# Patient Record
Sex: MALE | Race: AMERICAN INDIAN OR ALASKA NATIVE | URBAN - METROPOLITAN AREA
[De-identification: names, ages, dates, MRNs, and addresses within clinical notes are randomized per-mention and may not be internally consistent; named-entity substitution may affect disease eponyms.]

---

## 2018-05-04 ENCOUNTER — INPATIENT (INPATIENT)
Facility: HOSPITAL | Age: 23
LOS: 2 days | Discharge: HOME | End: 2018-05-07
Attending: HOSPITALIST | Admitting: HOSPITALIST
Payer: OTHER MISCELLANEOUS

## 2018-05-04 VITALS
DIASTOLIC BLOOD PRESSURE: 92 MMHG | RESPIRATION RATE: 16 BRPM | OXYGEN SATURATION: 100 % | TEMPERATURE: 98 F | SYSTOLIC BLOOD PRESSURE: 108 MMHG | HEART RATE: 90 BPM

## 2018-05-04 NOTE — ED ADULT TRIAGE NOTE - CHIEF COMPLAINT QUOTE
Pt C/O  right ankle pain and swelling since Tuesday. Pt states he works at [employer] had a box fall off shelf and landing on right ankle. Right ankle appears swollen/ red, + pedal pulses bilaterally. Denies PMH. Pt appears comfortable on assessment.

## 2018-05-05 LAB
ALBUMIN SERPL ELPH-MCNC: 4.5 G/DL — SIGNIFICANT CHANGE UP (ref 3.3–5)
ALP SERPL-CCNC: 99 U/L — SIGNIFICANT CHANGE UP (ref 40–120)
ALT FLD-CCNC: 14 U/L — SIGNIFICANT CHANGE UP (ref 4–41)
AST SERPL-CCNC: 17 U/L — SIGNIFICANT CHANGE UP (ref 4–40)
BASE EXCESS BLDV CALC-SCNC: 5.5 MMOL/L — SIGNIFICANT CHANGE UP
BILIRUB SERPL-MCNC: 0.5 MG/DL — SIGNIFICANT CHANGE UP (ref 0.2–1.2)
BLOOD GAS VENOUS - CREATININE: 1.16 MG/DL — SIGNIFICANT CHANGE UP (ref 0.5–1.3)
BUN SERPL-MCNC: 13 MG/DL — SIGNIFICANT CHANGE UP (ref 7–23)
CALCIUM SERPL-MCNC: 9.2 MG/DL — SIGNIFICANT CHANGE UP (ref 8.4–10.5)
CHLORIDE BLDV-SCNC: 102 MMOL/L — SIGNIFICANT CHANGE UP (ref 96–108)
CHLORIDE SERPL-SCNC: 98 MMOL/L — SIGNIFICANT CHANGE UP (ref 98–107)
CO2 SERPL-SCNC: 29 MMOL/L — SIGNIFICANT CHANGE UP (ref 22–31)
CREAT SERPL-MCNC: 1.2 MG/DL — SIGNIFICANT CHANGE UP (ref 0.5–1.3)
CRP SERPL-MCNC: 12.2 MG/L — HIGH
ERYTHROCYTE [SEDIMENTATION RATE] IN BLOOD: 4 MM/HR — SIGNIFICANT CHANGE UP (ref 1–15)
GAS PNL BLDV: 137 MMOL/L — SIGNIFICANT CHANGE UP (ref 136–146)
GLUCOSE BLDV-MCNC: 106 — HIGH (ref 70–99)
GLUCOSE SERPL-MCNC: 97 MG/DL — SIGNIFICANT CHANGE UP (ref 70–99)
HBA1C BLD-MCNC: 5.1 % — SIGNIFICANT CHANGE UP (ref 4–5.6)
HCO3 BLDV-SCNC: 28 MMOL/L — HIGH (ref 20–27)
HCT VFR BLD CALC: 38.7 % — LOW (ref 39–50)
HCT VFR BLDV CALC: 36.5 % — LOW (ref 39–51)
HGB BLD-MCNC: 11.9 G/DL — LOW (ref 13–17)
HGB BLDV-MCNC: 11.9 G/DL — LOW (ref 13–17)
LACTATE BLDV-MCNC: 1.1 MMOL/L — SIGNIFICANT CHANGE UP (ref 0.5–2)
MCHC RBC-ENTMCNC: 19.6 PG — LOW (ref 27–34)
MCHC RBC-ENTMCNC: 30.7 % — LOW (ref 32–36)
MCV RBC AUTO: 63.9 FL — LOW (ref 80–100)
NRBC # FLD: 0 — SIGNIFICANT CHANGE UP
PCO2 BLDV: 57 MMHG — HIGH (ref 41–51)
PH BLDV: 7.35 PH — SIGNIFICANT CHANGE UP (ref 7.32–7.43)
PLATELET # BLD AUTO: 236 K/UL — SIGNIFICANT CHANGE UP (ref 150–400)
PMV BLD: SIGNIFICANT CHANGE UP FL (ref 7–13)
PO2 BLDV: 34 MMHG — LOW (ref 35–40)
POTASSIUM BLDV-SCNC: 3.5 MMOL/L — SIGNIFICANT CHANGE UP (ref 3.4–4.5)
POTASSIUM SERPL-MCNC: 3.6 MMOL/L — SIGNIFICANT CHANGE UP (ref 3.5–5.3)
POTASSIUM SERPL-SCNC: 3.6 MMOL/L — SIGNIFICANT CHANGE UP (ref 3.5–5.3)
PROT SERPL-MCNC: 7 G/DL — SIGNIFICANT CHANGE UP (ref 6–8.3)
RBC # BLD: 6.06 M/UL — HIGH (ref 4.2–5.8)
RBC # FLD: 16.2 % — HIGH (ref 10.3–14.5)
SAO2 % BLDV: 53.1 % — LOW (ref 60–85)
SODIUM SERPL-SCNC: 138 MMOL/L — SIGNIFICANT CHANGE UP (ref 135–145)
WBC # BLD: 12.06 K/UL — HIGH (ref 3.8–10.5)
WBC # FLD AUTO: 12.06 K/UL — HIGH (ref 3.8–10.5)

## 2018-05-05 PROCEDURE — 73610 X-RAY EXAM OF ANKLE: CPT | Mod: 26,RT

## 2018-05-05 RX ORDER — PIPERACILLIN AND TAZOBACTAM 4; .5 G/20ML; G/20ML
3.38 INJECTION, POWDER, LYOPHILIZED, FOR SOLUTION INTRAVENOUS ONCE
Qty: 0 | Refills: 0 | Status: DISCONTINUED | OUTPATIENT
Start: 2018-05-05 | End: 2018-05-05

## 2018-05-05 RX ORDER — KETOROLAC TROMETHAMINE 30 MG/ML
30 SYRINGE (ML) INJECTION ONCE
Qty: 0 | Refills: 0 | Status: DISCONTINUED | OUTPATIENT
Start: 2018-05-05 | End: 2018-05-05

## 2018-05-05 RX ORDER — SODIUM CHLORIDE 9 MG/ML
1000 INJECTION INTRAMUSCULAR; INTRAVENOUS; SUBCUTANEOUS ONCE
Qty: 0 | Refills: 0 | Status: COMPLETED | OUTPATIENT
Start: 2018-05-05 | End: 2018-05-05

## 2018-05-05 RX ORDER — SODIUM CHLORIDE 9 MG/ML
1000 INJECTION INTRAMUSCULAR; INTRAVENOUS; SUBCUTANEOUS
Qty: 0 | Refills: 0 | Status: DISCONTINUED | OUTPATIENT
Start: 2018-05-05 | End: 2018-05-07

## 2018-05-05 RX ADMIN — Medication 30 MILLIGRAM(S): at 03:25

## 2018-05-05 RX ADMIN — SODIUM CHLORIDE 100 MILLILITER(S): 9 INJECTION INTRAMUSCULAR; INTRAVENOUS; SUBCUTANEOUS at 06:13

## 2018-05-05 RX ADMIN — Medication 100 MILLIGRAM(S): at 11:09

## 2018-05-05 RX ADMIN — Medication 100 MILLIGRAM(S): at 19:30

## 2018-05-05 RX ADMIN — SODIUM CHLORIDE 100 MILLILITER(S): 9 INJECTION INTRAMUSCULAR; INTRAVENOUS; SUBCUTANEOUS at 15:18

## 2018-05-05 RX ADMIN — Medication 30 MILLIGRAM(S): at 03:15

## 2018-05-05 RX ADMIN — Medication 100 MILLIGRAM(S): at 02:56

## 2018-05-05 RX ADMIN — SODIUM CHLORIDE 1000 MILLILITER(S): 9 INJECTION INTRAMUSCULAR; INTRAVENOUS; SUBCUTANEOUS at 05:17

## 2018-05-05 NOTE — ED PROVIDER NOTE - MEDICAL DECISION MAKING DETAILS
likely ankle sprain; xray; aircast likely ankle sprain; xray; aircast  on reexam has scab luis from metal crate and findings consistent with cellulitis; not septic and able to range ankle minimally; no joint effusion on xray; will send ESR/CRP and place in CDU for cellulitis

## 2018-05-05 NOTE — ED CDU PROVIDER INITIAL DAY NOTE - ATTENDING CONTRIBUTION TO CARE
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  Was ambulatory at first but then swelling started yesterday along lateral malleolus and today got worse to point he was having trouble walking prompting him to come in.  Denies any other complaints.  Sustained small scab luis from metal crate.  Has noticed worsening swelling and warmth.    PE: well appearing; VSS: CTAB/L; s1 s2 no m/r/g abd soft/NT/ND ext: right ankle swollen; erythematous; small scab luis from metal crate; ankle able to be ranged; extremely warm

## 2018-05-05 NOTE — ED CDU PROVIDER INITIAL DAY NOTE - SKIN WOUND TYPE
small left lat malleoli , scabbed  with surrounding erythema and ST swelling lat mal with ttp/LACERATION(S)

## 2018-05-05 NOTE — ED ADULT NURSE REASSESSMENT NOTE - NS ED NURSE REASSESS COMMENT FT1
ARNALDO Mayes made aware Pt. Blood Pressure as noted on the flowsheet. waiting for further orders and will monitored pt. ARNALDO Mayes made aware Pt. Blood Pressure as noted on the flowsheet. waiting for further orders and will monitored pt. Pt AOX3, denies HA dizziness SOB CP palpitation weakness N V sweating

## 2018-05-05 NOTE — ED ADULT NURSE NOTE - OBJECTIVE STATEMENT
23 year old male, A&Ox3, c/o right ankle/foot pain, swelling, redness x 4 days. Pt. reports he "dropped a metal box" onto his right foot 4 days ago, experienced some initial pain, and had an abrasion on the outside of his ankle, reports pain initially subsided, but returned with swelling and redness 2 days ago. States he is now unable to walk due to pain. Right ankle/foot swelling, redness on lateral aspect of ankle with scabbed abrasion, warm to touch. Pt. able to move toes, sensation intact, but limited ROM of right ankle. No discharge from site of abrasion noted. Respirations even, unlabored. IV placed, labs sent, medicated as ordered.

## 2018-05-05 NOTE — ED PROVIDER NOTE - OBJECTIVE STATEMENT
23 yr old male with no sig PMH presents s/p box falling and hitting him on right ankle 3 days ago.  Was ambulatory at first but then swelling started yesterday along lateral malleolus and today got worse to point he was having trouble walking prompting him to come in.  Denies any other complaints.

## 2018-05-05 NOTE — ED CDU PROVIDER INITIAL DAY NOTE - MEDICAL DECISION MAKING DETAILS
Left ankle cellulitis- ESR still pending, pt afebrile with limited rom. xray ng.  will monitor vitals, provide pain control prn, IV AB, and re-eval

## 2018-05-05 NOTE — ED CDU PROVIDER INITIAL DAY NOTE - PROGRESS NOTE DETAILS
Pt resting comfortably upon arrival to CDU.  Vitals stable/improved. CRP 12 and Esr wnl, Will continue to monitor sx/vitals, and give IV AB/fluids Patient resting comfortably in CDU.  No acute complaints except pain with weight-bearing R ankle.  Exam:  well appearing, vital signs within normal limits, R ankle with edema, erythema, tenderness to palpation; able to range joint passively but pain with active range of motion, neurovascularly intact Patient laying comfortably in bed NAD, No acute complaints, except pain with weight bearing. Will continue to monitor and reassess after 1 more dose of clindamycin. This patient was signed out to me by ED attending Dr. Olsen at 1900 hrs.; test results and orders reviewed; plan of care discussed.  In the interim, pt has been resting comfortably; no issues or c/o thus far.  Pt. verbalizes feeling some improvement of degree of swelling to right foot; states erythema appears improved from day prior.  On exam, faint erythema inside area previously demarcated; no extension noted outside line of demarcation.  Right foot laterally is mildly edematous; no erythematous streaking or RLE swelling otherwise noted.  No calf TTP on exam.  Pt. has benign cardio/pulmonary exam.  Pt stable at present; will continue to monitor.

## 2018-05-05 NOTE — ED CDU PROVIDER INITIAL DAY NOTE - OBJECTIVE STATEMENT
23 yr old male with no sig PMH presents to the ED with right ankle pain ans swelling. Pt states he was at work when a metal box fell from a shelf and hit him on the right ankle 3 days ago.  Was ambulatory at first but then swelling started yesterday along lateral malleolus and today got worse to point he was having trouble walking prompting him to come to the ED.   Denies any fevers, chills, sensation changes to the extremities, or any  other complaints. UTD with Tdap 23 yr old male with no sig PMH presents to the ED with right ankle pain ans swelling. Pt states he was at work when a metal box fell from a shelf and hit him on the right ankle 3 days ago.  Was ambulatory at first but then swelling started yesterday along lateral malleolus and today got worse to point he was having trouble walking prompting him to come to the ED.   Denies any fevers, chills, sensation changes to the extremities, or any  other complaints. UTD with Tdap  Placed in CDU to monitor vitals/ LE cellulitis/ROM, and to cont IV AB clinda.

## 2018-05-06 DIAGNOSIS — D50.9 IRON DEFICIENCY ANEMIA, UNSPECIFIED: ICD-10-CM

## 2018-05-06 DIAGNOSIS — L03.115 CELLULITIS OF RIGHT LOWER LIMB: ICD-10-CM

## 2018-05-06 LAB
HCT VFR BLD CALC: 38.8 % — LOW (ref 39–50)
HGB BLD-MCNC: 11.6 G/DL — LOW (ref 13–17)
MCHC RBC-ENTMCNC: 19.4 PG — LOW (ref 27–34)
MCHC RBC-ENTMCNC: 29.9 % — LOW (ref 32–36)
MCV RBC AUTO: 65 FL — LOW (ref 80–100)
NRBC # FLD: 0 — SIGNIFICANT CHANGE UP
PLATELET # BLD AUTO: 209 K/UL — SIGNIFICANT CHANGE UP (ref 150–400)
PMV BLD: 11.2 FL — SIGNIFICANT CHANGE UP (ref 7–13)
RBC # BLD: 5.97 M/UL — HIGH (ref 4.2–5.8)
RBC # FLD: 15.9 % — HIGH (ref 10.3–14.5)
SPECIMEN SOURCE: SIGNIFICANT CHANGE UP
SPECIMEN SOURCE: SIGNIFICANT CHANGE UP
WBC # BLD: 7.28 K/UL — SIGNIFICANT CHANGE UP (ref 3.8–10.5)
WBC # FLD AUTO: 7.28 K/UL — SIGNIFICANT CHANGE UP (ref 3.8–10.5)

## 2018-05-06 PROCEDURE — 99223 1ST HOSP IP/OBS HIGH 75: CPT

## 2018-05-06 PROCEDURE — 73630 X-RAY EXAM OF FOOT: CPT | Mod: 26,RT

## 2018-05-06 RX ORDER — PIPERACILLIN AND TAZOBACTAM 4; .5 G/20ML; G/20ML
3.38 INJECTION, POWDER, LYOPHILIZED, FOR SOLUTION INTRAVENOUS EVERY 8 HOURS
Qty: 0 | Refills: 0 | Status: DISCONTINUED | OUTPATIENT
Start: 2018-05-06 | End: 2018-05-06

## 2018-05-06 RX ADMIN — SODIUM CHLORIDE 100 MILLILITER(S): 9 INJECTION INTRAMUSCULAR; INTRAVENOUS; SUBCUTANEOUS at 21:56

## 2018-05-06 RX ADMIN — Medication 100 MILLIGRAM(S): at 10:14

## 2018-05-06 RX ADMIN — SODIUM CHLORIDE 100 MILLILITER(S): 9 INJECTION INTRAMUSCULAR; INTRAVENOUS; SUBCUTANEOUS at 10:16

## 2018-05-06 RX ADMIN — Medication 100 MILLIGRAM(S): at 03:27

## 2018-05-06 RX ADMIN — Medication 100 MILLIGRAM(S): at 18:02

## 2018-05-06 RX ADMIN — PIPERACILLIN AND TAZOBACTAM 25 GRAM(S): 4; .5 INJECTION, POWDER, LYOPHILIZED, FOR SOLUTION INTRAVENOUS at 12:16

## 2018-05-06 NOTE — ED CDU PROVIDER SUBSEQUENT DAY NOTE - CONSTITUTIONAL, MLM
normal... Well appearing, well nourished, awake, alert, oriented to person, place, time/situation and in no apparent distress. Pt. verbalizing in full, clear, effortless sentences.

## 2018-05-06 NOTE — H&P ADULT - PROBLEM SELECTOR PLAN 2
MCV 63.9, hb 11.9. No hx of anemia but FH of iron deficiency anemia.   - will order Iron studies: Iron, Ferritin, TIBC

## 2018-05-06 NOTE — ED CDU PROVIDER SUBSEQUENT DAY NOTE - MUSCULOSKELETAL, MLM
FAROM to RLE; +NVI.  Right lateral ankle with demarcated area that contains faint erythema and increased warmth; erythema has not spread outside area of demarcation.  Right anterolateral foot with mild generalized edema (NTTP).  No streaking erythema to RLE; no calf TTP.

## 2018-05-06 NOTE — H&P ADULT - PROBLEM SELECTOR PLAN 1
erythema and swelling associated with leukocytosis to 12.06.   - Xray: soft tissue swelling, neg for acute fracture  - c/w clindamycin and zosyn  - f/u BCx

## 2018-05-06 NOTE — H&P ADULT - NSHPPHYSICALEXAM_GEN_ALL_CORE
Vital Signs Last 24 Hrs  T(C): 37 (06 May 2018 09:34), Max: 37.4 (05 May 2018 18:22)  T(F): 98.6 (06 May 2018 09:34), Max: 99.4 (05 May 2018 18:22)  HR: 73 (06 May 2018 09:34) (73 - 95)  BP: 102/56 (06 May 2018 09:34) (102/56 - 124/77)  BP(mean): --  RR: 14 (06 May 2018 09:34) (14 - 16)  SpO2: 100% (06 May 2018 09:34) (100% - 100%)    GENERAL: NAD, well-developed  HEAD:  Atraumatic, Normocephalic  EYES: EOMI, conjunctiva and sclera clear  NECK: Supple, No JVD  CHEST/LUNG: Clear to auscultation bilaterally; No wheeze, rales or rhonchi  HEART: Regular rate and rhythm; No murmurs, rubs, or gallops  ABDOMEN: Soft, Nontender, Nondistended; Bowel sounds present  EXTREMITIES:  2+ Peripheral Pulses, No clubbing, cyanosis. pulses intact b/l, no edema in LLE.  RLE- pulses 2+, significantly erythema along dorsum of foot and lateral maleolus, 1+ edema. ROM RLE limited, 5/5 motor.  PSYCH: AAOx3  NEUROLOGY: non-focal  SKIN: +erythema along dorsum of foot and lateral maleolus. Vital Signs Last 24 Hrs  T(C): 37 (06 May 2018 09:34), Max: 37.4 (05 May 2018 18:22)  T(F): 98.6 (06 May 2018 09:34), Max: 99.4 (05 May 2018 18:22)  HR: 73 (06 May 2018 09:34) (73 - 95)  BP: 102/56 (06 May 2018 09:34) (102/56 - 124/77)  BP(mean): --  RR: 14 (06 May 2018 09:34) (14 - 16)  SpO2: 100% (06 May 2018 09:34) (100% - 100%)    GENERAL: NAD, well-developed  HEAD:  Atraumatic, Normocephalic  EYES: EOMI, conjunctiva and sclera clear  NECK: Supple, No JVD  CHEST/LUNG: Clear to auscultation bilaterally; No wheeze, rales or rhonchi  HEART: Regular rate and rhythm; No murmurs, rubs, or gallops  ABDOMEN: Soft, Nontender, Nondistended; Bowel sounds present  EXTREMITIES:  2+ Peripheral Pulses, No clubbing, cyanosis. pulses intact b/l, no edema in LLE.  RLE- pulses 2+, mild erythema along the dorsum of foot and lateral maleolus, 2+ edema. Dry scab on the lateral side of the RLE, ROM RLE limited due to pain  PSYCH: AAOx3  NEUROLOGY: non-focal  SKIN: +erythema and edema along dorsum of foot and lateral malleolus.

## 2018-05-06 NOTE — ED CDU PROVIDER SUBSEQUENT DAY NOTE - PROGRESS NOTE DETAILS
Pt resting comfortably in the interim.  Pt. sleeping comfortably / stable at present; will continue to monitor.  Pt. will be signed out to the CDU day PA and attending at 0700 hrs.

## 2018-05-06 NOTE — ED CDU PROVIDER DISPOSITION NOTE - CLINICAL COURSE
lupis: swelling to infected site increasing. pt failing clindamycin alone. WIll broaden coverage and admit to hospital.

## 2018-05-06 NOTE — H&P ADULT - NSHPSOCIALHISTORY_GEN_ALL_CORE
No tobacco use, no alcohol use, no illicit drug use  Lives with parents, goes to college, has final exams tomorrow  Diet enriched with meat products  Does not use any herbal supplements/protein supplements currently, did use whey protein 3 months ago.

## 2018-05-06 NOTE — H&P ADULT - NSHPLABSRESULTS_GEN_ALL_CORE
11.9   12.06 )-----------( 236      ( 05 May 2018 03:11 )             38.7     05-05    138  |  98  |  13  ----------------------------<  97  3.6   |  29  |  1.20    Ca    9.2      05 May 2018 03:11    TPro  7.0  /  Alb  4.5  /  TBili  0.5  /  DBili  x   /  AST  17  /  ALT  14  /  AlkPhos  99  05-05    Xray Right ankle:  MPRESSION:    Prominent lateral ankle soft tissue swelling. No tracking soft tissue gas   collections, radiopaque foreign bodies, or gross radiographic evidence   for osteomyelitis.    No fractures or dislocations.    Congruent ankle mortise with smooth and intact talar dome.    Preserved remaining visualized joint spaces.    No calcaneal spurring and unremarkable appearing Achilles tendon shadow.    No lytic or blastic lesions.

## 2018-05-06 NOTE — ED CDU PROVIDER SUBSEQUENT DAY NOTE - ATTENDING CONTRIBUTION TO CARE
lupis: pt transferred from ED earlier because of concern of cellulitis and antibiotics were stated PTA. There was a hx of blunt trauma several days ago followed by increasing local pain, redness and swelling.   Pt transferred to CDU for observation and continuation of IV antibiotics.

## 2018-05-06 NOTE — H&P ADULT - HISTORY OF PRESENT ILLNESS
Pt is a 22 y/o M with no PMH p/w right foot swelling and pain.  Patient dropped a metal box which fell on his right foot.  He was initially tolerating the pain but then was unable to walk without assistance/pain.  He noted that the swelling and redness worsened on 5/3, which prompted him to come to the ED.  He denies any fevers, chills, numbness, tingling, or changes in sensation.  He notes that the redness has minimally improved since being in the ED.      When he presented to the ED, T 98.4, HR 90, 106/92, RR 16.  Xray of the ankle was significant for lateral ankle soft tissue swelling but no fracture.  He was started on zosyn and clindamycin for treatment of cellulitis and kept in the CDU for further observation and administration of IV clindamycin. Per ED notes, erythema has improved     Placed in CDU to monitor vitals/ LE cellulitis/ROM, and to cont IV AB clinda."  In the interim, erythema to RLE has improved along with swelling to lateral right ankle region at area of erythema, though swelling to dorsum of foot had increased, so plan of care continued and pt kept for observation/continued management for additional day. Pt is a 24 y/o M with no PMH p/w right foot swelling and pain.  Patient dropped a metal box which fell on his right foot.  He was initially tolerating the pain but then was unable to walk without assistance/pain.  He noted that the swelling and redness worsened on 5/3, which prompted him to come to the ED.  He denies any fevers, chills, numbness, tingling, or changes in sensation.  He notes that the redness has minimally improved since being in the ED.    Patient denies shortness of breath, chest pain, abdominal pain, nausea, vomiting, diarrhea, constipation, urinary symptoms. He denies fatigue, weakness or weight loss.  No recent travel or sick contacts.     When he presented to the ED, T 98.4, HR 90, 106/92, RR 16.  Xray of the ankle was significant for lateral ankle soft tissue swelling but no fracture.  He was started on zosyn and clindamycin for treatment of cellulitis and kept in the CDU for further observation and administration of IV clindamycin. Per ED notes, erythema has improved but swelling of dorsum of foot is increased. Pt is a 24 y/o M with no PMH p/w right foot swelling and pain.  Patient dropped a metal box which fell on his right foot on Tuesday.  He was initially tolerating the pain but then was unable to walk without assistance/pain.  He noted that the swelling and redness worsened on 5/3, which prompted him to come to the ED.  He denies any fevers, chills, numbness, tingling, or changes in sensation.  He notes that the redness has minimally improved since being in the ED.    Patient denies shortness of breath, chest pain, abdominal pain, nausea, vomiting, diarrhea, constipation, urinary symptoms. He denies fatigue, weakness or weight loss.  No recent travel or sick contacts.     When he presented to the ED, T 98.4, HR 90, 106/92, RR 16.  Xray of the ankle was significant for lateral ankle soft tissue swelling but no fracture.  He was started on zosyn and clindamycin for treatment of cellulitis and kept in the CDU for further observation and administration of IV clindamycin. Per ED notes, erythema has improved but swelling of dorsum of foot is increased.

## 2018-05-06 NOTE — H&P ADULT - FAMILY HISTORY
Mother  Still living? Unknown  Family history of iron deficiency anemia, Age at diagnosis: Age Unknown

## 2018-05-06 NOTE — ED CDU PROVIDER SUBSEQUENT DAY NOTE - HISTORY
CDU Initial Note HPI: "23 yr old male with no sig PMH presents to the ED with right ankle pain ans swelling. Pt states he was at work when a metal box fell from a shelf and hit him on the right ankle 3 days ago.  Was ambulatory at first but then swelling started yesterday along lateral malleolus and today got worse to point he was having trouble walking prompting him to come to the ED.   Denies any fevers, chills, sensation changes to the extremities, or any  other complaints. UTD with Tdap  Placed in CDU to monitor vitals/ LE cellulitis/ROM, and to cont IV AB clinda."  In the interim, erythema to RLE has improved along with swelling to lateral right ankle region at area of erythema, though swelling to dorsum of foot had increased, so plan of care continued and pt kept for observation/continued management for additional day.

## 2018-05-06 NOTE — H&P ADULT - ASSESSMENT
Pt is a 24 y/o M with no PMH p/w right LE swelling and erythema c/w cellulitis, found to have microcytic anemia. Pt is a 24 y/o M with no PMH p/w right LE swelling and erythema c/w cellulitis in the setting of trauma also found to have microcytic anemia.

## 2018-05-06 NOTE — H&P ADULT - ATTENDING COMMENTS
Pt was seeb and examined, agree with the H&P done byon Tuesday, went to work on Wednesday without any problem, Thursday by the end of the day noted swelling and pain of his rt foot, gradually worsening with redness, pain and was unable to ambulate.  He was initially tolerating the pain but then was unable to walk without assistance/pain. Pt was seen and examined, agree with the H&P done by HS edited as appropriate, briefly this is a 24 y/o M with no PMH p/w right LE swelling and erythema c/w cellulitis in the setting of trauma also found to have microcytic anemia. (On Tuesday metal box fell on his foot while at work in [employer], went to work on Wednesday without any problem, Thursday by the end of the day noted swelling, redness and pain of his rt foot which gradually worsened and was unable to ambulate which prompted him to come to ER). Pt was seen and examined, agree with the H&P done by HS edited as appropriate, briefly this is a 24 y/o M with no PMH (On Tuesday metal box fell on his foot while at work in [employer], went to work on Wednesday without any problem, Thursday by the end of the day noted swelling, redness and pain of his rt foot which gradually worsened and was unable to ambulate which prompted him to come to ER). a/w right LE swelling and erythema c/w cellulitis in the setting of trauma also found to have microcytic anemia.  Exam: as above  Plan: Cont Clindamycin, d/c zosyn, if pt becomes febrile or if leukocytosis worsens will broaden abx coverage, check cbc today, foot xrays to r/o fx. Plan discussed with HS.

## 2018-05-07 ENCOUNTER — TRANSCRIPTION ENCOUNTER (OUTPATIENT)
Age: 23
End: 2018-05-07

## 2018-05-07 VITALS
SYSTOLIC BLOOD PRESSURE: 107 MMHG | RESPIRATION RATE: 16 BRPM | HEART RATE: 72 BPM | TEMPERATURE: 99 F | DIASTOLIC BLOOD PRESSURE: 58 MMHG | OXYGEN SATURATION: 100 %

## 2018-05-07 LAB
FERRITIN SERPL-MCNC: 157.3 NG/ML — SIGNIFICANT CHANGE UP (ref 30–400)
HCT VFR BLD CALC: 39.2 % — SIGNIFICANT CHANGE UP (ref 39–50)
HGB BLD-MCNC: 12 G/DL — LOW (ref 13–17)
IRON SATN MFR SERPL: 223 UG/DL — SIGNIFICANT CHANGE UP (ref 155–535)
IRON SATN MFR SERPL: 23 UG/DL — LOW (ref 45–165)
MCHC RBC-ENTMCNC: 19.8 PG — LOW (ref 27–34)
MCHC RBC-ENTMCNC: 30.6 % — LOW (ref 32–36)
MCV RBC AUTO: 64.6 FL — LOW (ref 80–100)
NRBC # FLD: 0 — SIGNIFICANT CHANGE UP
PLATELET # BLD AUTO: 184 K/UL — SIGNIFICANT CHANGE UP (ref 150–400)
PMV BLD: SIGNIFICANT CHANGE UP FL (ref 7–13)
RBC # BLD: 6.07 M/UL — HIGH (ref 4.2–5.8)
RBC # FLD: 17.2 % — HIGH (ref 10.3–14.5)
UIBC SERPL-MCNC: 200.2 UG/DL — SIGNIFICANT CHANGE UP (ref 110–370)
WBC # BLD: 7.79 K/UL — SIGNIFICANT CHANGE UP (ref 3.8–10.5)
WBC # FLD AUTO: 7.79 K/UL — SIGNIFICANT CHANGE UP (ref 3.8–10.5)

## 2018-05-07 PROCEDURE — 99239 HOSP IP/OBS DSCHRG MGMT >30: CPT

## 2018-05-07 RX ORDER — FERROUS SULFATE 325(65) MG
1 TABLET ORAL
Qty: 30 | Refills: 0 | OUTPATIENT
Start: 2018-05-07 | End: 2018-06-05

## 2018-05-07 RX ORDER — CEPHALEXIN 500 MG
1 CAPSULE ORAL
Qty: 16 | Refills: 0 | OUTPATIENT
Start: 2018-05-07 | End: 2018-05-10

## 2018-05-07 RX ORDER — FERROUS SULFATE 325(65) MG
325 TABLET ORAL DAILY
Qty: 0 | Refills: 0 | Status: DISCONTINUED | OUTPATIENT
Start: 2018-05-07 | End: 2018-05-07

## 2018-05-07 RX ADMIN — Medication 100 MILLIGRAM(S): at 13:26

## 2018-05-07 RX ADMIN — Medication 100 MILLIGRAM(S): at 05:51

## 2018-05-07 RX ADMIN — Medication 325 MILLIGRAM(S): at 11:10

## 2018-05-07 RX ADMIN — SODIUM CHLORIDE 100 MILLILITER(S): 9 INJECTION INTRAMUSCULAR; INTRAVENOUS; SUBCUTANEOUS at 11:10

## 2018-05-07 RX ADMIN — SODIUM CHLORIDE 100 MILLILITER(S): 9 INJECTION INTRAMUSCULAR; INTRAVENOUS; SUBCUTANEOUS at 05:48

## 2018-05-07 NOTE — DISCHARGE NOTE ADULT - CARE PLAN
Principal Discharge DX:	Cellulitis of right lower extremity  Goal:	Treatment  Assessment and plan of treatment:	You have an infection of your right leg.  Please continue antibiotics as prescribed.  Follow up with your primary care physician within 1 week of discharge if symptoms fail to resolve or worsen.  Secondary Diagnosis:	Microcytic anemia  Goal:	Further evaluation  Assessment and plan of treatment:	You were found to have iron deficiency anemia. Please take iron supplementation as prescribed.  Please follow up with your primary care physician within 1 week of discharge for further evaluation of anemia.

## 2018-05-07 NOTE — PROGRESS NOTE ADULT - PROBLEM SELECTOR PLAN 1
erythema and swelling associated with leukocytosis to 12.06. Leukocytosis now downtrending  - Xray ankle: soft tissue swelling, neg for acute fracture  - BCx: NGTD  - c/w clindamycin  - f/u xray of foot for acute fracture erythema and swelling associated with leukocytosis to 12.06. Leukocytosis now downtrending  - Xray ankle: soft tissue swelling, neg for acute fracture  - xray foot: neg for acute fracture, no osteomyelitis  - BCx: NGTD  - c/w clindamycin  - f/u xray of foot for acute fracture

## 2018-05-07 NOTE — DISCHARGE NOTE ADULT - PLAN OF CARE
Treatment You have an infection of your right leg.  Please continue antibiotics as prescribed.  Follow up with your primary care physician within 1 week of discharge if symptoms fail to resolve or worsen. Further evaluation You were found to have iron deficiency anemia. Please take iron supplementation as prescribed.  Please follow up with your primary care physician within 1 week of discharge for further evaluation of anemia.

## 2018-05-07 NOTE — DISCHARGE NOTE ADULT - HOSPITAL COURSE
Pt is a 22 y/o M with no PMH p/w swelling and erythema of right ankle.  Patient states that he dropped a metal box on his right foot on 5/1/18. Since then, the swelling and erythema has been worsening. He also is having difficulty ambulating.  Patient presented to the hospital on 5/4 as his symptoms began to worsen.  Patient started on clindamycin and zosyn and was being watched in the CDU.  Patient admitted to medicine on 5/6 as the erythema and swelling was not improving  Zosyn was discontinued.  Ankle and foot xrays were both negative for acute fracture.  Patient was also found to have a microcytic anemia with MCV at 63.  Iron studies showed low iron, but normal TIBC and ferritin.  Patient started on ferrous sulfate.      Patient advised to f/u with PCP within 1 week of discharge for further evaluation of iron deficiency anemia. Pt is a 24 y/o M with no PMH p/w swelling and erythema of right ankle.  Patient states that he dropped a metal box on his right foot on 5/1/18. Since then, the swelling and erythema has been worsening. He also is having difficulty ambulating.  Patient presented to the hospital on 5/4 as his symptoms began to worsen.  Patient started on clindamycin and zosyn and was being watched in the CDU.  Patient admitted to medicine on 5/6 as the erythema and swelling was not improving  Zosyn was discontinued.  Ankle and foot xrays were both negative for acute fracture.  Patient was also found to have a microcytic anemia with MCV at 63.  Iron studies showed low iron, but normal TIBC and ferritin.  Patient started on ferrous sulfate.      Patient advised to f/u with PCP within 1 week of discharge for further evaluation of iron deficiency anemia.

## 2018-05-07 NOTE — PHYSICAL THERAPY INITIAL EVALUATION ADULT - PERTINENT HX OF CURRENT PROBLEM, REHAB EVAL
24 y/o M with no PMH p/w right LE swelling and erythema c/w cellulitis in the setting of trauma also found to have microcytic anemia.

## 2018-05-07 NOTE — PROGRESS NOTE ADULT - ASSESSMENT
Pt is a 24 y/o M with no PMH p/w right LE swelling and erythema c/w cellulitis in the setting of trauma also found to have microcytic anemia.

## 2018-05-07 NOTE — DISCHARGE NOTE ADULT - CARE PROVIDER_API CALL
Quan Gonzalez Sayed  [address] [facility]  [phone]  Phone: (   )    -  Fax: (   )    -

## 2018-05-07 NOTE — DISCHARGE NOTE ADULT - PROVIDER TOKENS
FREE:[LAST:[Gonzalez],FIRST:[Quan Sayed],PHONE:[(   )    -],FAX:[(   )    -],ADDRESS:[[phone] [facility]  [phone]]]

## 2018-05-07 NOTE — PROGRESS NOTE ADULT - PROBLEM SELECTOR PLAN 2
MCV 63.9, hb 11.9. No hx of anemia but FH of iron deficiency anemia.   - Iron Low, TIBC N  - f/u ferritin MCV 63.9, hb 11.9. No hx of anemia but FH of iron deficiency anemia.   - Iron Low, TIBC N, ferritin wnl  - start ferrous sulfate supplement

## 2018-05-07 NOTE — DISCHARGE NOTE ADULT - PATIENT PORTAL LINK FT
You can access the [facility] Patient Portal, offered by [facility], by registering with the following website: [email or web address]

## 2018-05-07 NOTE — DISCHARGE NOTE ADULT - CONDITIONS AT DISCHARGE
patient is stable, improved and discharged , Ankle is still warm to touch , only has pain when he stands on it ; he will continue oral Antibiotics at Home.  All Vital Signs are stable 2+ edema is noted also and he has some ecchymotic areas.  He is Provided with Crutches and was taught how to use them.

## 2018-05-07 NOTE — PROGRESS NOTE ADULT - ATTENDING COMMENTS
Patient seen and examined. Agree with above note by resident.    Right foot cellulitis - s/p trauma to the foot. Patient reports significant improvement in swelling and redness compared to presentation. Does not need MRSA or peusodmona coverage. Will treat with keflex. Crutches as patient is unable to ambulate on right foot. Xray without any fractures. Ferrous sulfate started for microcytic anemia. DC home with outpt follow up. TIme spent 35 mins.

## 2018-05-07 NOTE — DISCHARGE NOTE ADULT - MEDICATION SUMMARY - MEDICATIONS TO TAKE
I will START or STAY ON the medications listed below when I get home from the hospital:    cephalexin 500 mg oral tablet  -- 1 tab(s) by mouth 4 times a day   -- Finish all this medication unless otherwise directed by prescriber.    -- Indication: For Cellulitis of right lower extremity    ferrous sulfate 325 mg (65 mg elemental iron) oral tablet  -- 1 tab(s) by mouth once a day   -- Check with your doctor before becoming pregnant.  Do not chew, break, or crush.  May discolor urine or feces.    -- Indication: For iron deficiency anemia

## 2018-05-07 NOTE — PROGRESS NOTE ADULT - SUBJECTIVE AND OBJECTIVE BOX
Patient is a 23y old  Male who presents with a chief complaint of Swollen right ankle (06 May 2018 13:25)      SUBJECTIVE / OVERNIGHT EVENTS:    MEDICATIONS  (STANDING):  clindamycin IVPB 600 milliGRAM(s) IV Intermittent every 8 hours  sodium chloride 0.9%. 1000 milliLiter(s) (100 mL/Hr) IV Continuous <Continuous>    MEDICATIONS  (PRN):      CAPILLARY BLOOD GLUCOSE        I&O's Summary    Vital Signs Last 24 Hrs  T(C): 36.3 (07 May 2018 05:46), Max: 37.1 (06 May 2018 15:08)  T(F): 97.4 (07 May 2018 05:46), Max: 98.8 (06 May 2018 15:08)  HR: 67 (07 May 2018 05:46) (67 - 89)  BP: 106/55 (07 May 2018 05:46) (102/56 - 110/70)  BP(mean): --  RR: 17 (07 May 2018 05:46) (14 - 18)  SpO2: 100% (07 May 2018 05:46) (100% - 100%)    GENERAL: NAD, well-developed  HEAD:  Atraumatic, Normocephalic  EYES: EOMI, conjunctiva and sclera clear  NECK: Supple, No JVD  CHEST/LUNG: Clear to auscultation bilaterally; No wheeze, rales or rhonchi  HEART: Regular rate and rhythm; No murmurs, rubs, or gallops  ABDOMEN: Soft, Nontender, Nondistended; Bowel sounds present  EXTREMITIES:  2+ Peripheral Pulses, No clubbing, cyanosis. pulses intact b/l, no edema in LLE.  RLE- pulses 2+, mild erythema along the dorsum of foot and lateral maleolus, 2+ edema. Dry scab on the lateral side of the RLE, ROM RLE limited due to pain  PSYCH: AAOx3  NEUROLOGY: non-focal  SKIN: +erythema and edema along dorsum of foot and lateral malleolus.    LABS:                        12.0   7.79  )-----------( 184      ( 07 May 2018 05:30 )             39.2     Auto Eosinophil # x     / Auto Eosinophil % x     / Auto Neutrophil # x     / Auto Neutrophil % x     / BANDS % x                            11.6   7.28  )-----------( 209      ( 06 May 2018 13:35 )             38.8     Auto Eosinophil # x     / Auto Eosinophil % x     / Auto Neutrophil # x     / Auto Neutrophil % x     / BANDS % x                        RESPIRATORY  VENT:    ABG:     VBG:     RADIOLOGY & ADDITIONAL TESTS:    Imaging Personally Reviewed:    Consultant(s) Notes Reviewed:      Care Discussed with Consultants/Other Providers: Patient is a 23y old  Male who presents with a chief complaint of Swollen right ankle (06 May 2018 13:25)      SUBJECTIVE / OVERNIGHT EVENTS:  No overnight events. Continues to have difficulty with ROM 2/2 to pain. Unable to walk on RLE.   No fevers and chills. No sob, chest pain, abdominal pain, nausea, vomiting, diarrhea, constipation.    MEDICATIONS  (STANDING):  clindamycin IVPB 600 milliGRAM(s) IV Intermittent every 8 hours  sodium chloride 0.9%. 1000 milliLiter(s) (100 mL/Hr) IV Continuous <Continuous>    MEDICATIONS  (PRN):      CAPILLARY BLOOD GLUCOSE    I&O's Summary    Vital Signs Last 24 Hrs  T(C): 36.3 (07 May 2018 05:46), Max: 37.1 (06 May 2018 15:08)  T(F): 97.4 (07 May 2018 05:46), Max: 98.8 (06 May 2018 15:08)  HR: 67 (07 May 2018 05:46) (67 - 89)  BP: 106/55 (07 May 2018 05:46) (102/56 - 110/70)  BP(mean): --  RR: 17 (07 May 2018 05:46) (14 - 18)  SpO2: 100% (07 May 2018 05:46) (100% - 100%)    GENERAL: NAD, well-developed  HEAD:  Atraumatic, Normocephalic  EYES: EOMI, conjunctiva and sclera clear  NECK: Supple, No JVD  CHEST/LUNG: Clear to auscultation bilaterally; No wheeze, rales or rhonchi  HEART: Regular rate and rhythm; No murmurs, rubs, or gallops  ABDOMEN: Soft, Nontender, Nondistended; Bowel sounds present  EXTREMITIES:  2+ Peripheral Pulses, No clubbing, cyanosis. pulses intact b/l, no edema in LLE.  RLE- pulses 2+, mild erythema along the dorsum of foot and lateral maleolus, 2+ edema. Dry scab on the lateral side of the RLE, ROM RLE limited due to pain  PSYCH: AAOx3  NEUROLOGY: non-focal  SKIN: +erythema and edema along dorsum of foot and lateral malleolus, dry scab on the lateral side of the RLE    LABS:                                     12.0   7.79  )-----------( 184      ( 07 May 2018 05:30 )             39.2       RESPIRATORY  VENT:    ABG:     VBG:     RADIOLOGY & ADDITIONAL TESTS:  ----  Foot xray  IMPRESSION:     No radiographic evidence of osteomyelitis.    Preserved joint spaces.      Mild diffuse soft tissue swelling.  ---  Imaging Personally Reviewed:    Consultant(s) Notes Reviewed:      Care Discussed with Consultants/Other Providers:

## 2018-05-10 LAB
BACTERIA BLD CULT: SIGNIFICANT CHANGE UP
BACTERIA BLD CULT: SIGNIFICANT CHANGE UP

## 2018-07-12 NOTE — H&P ADULT - NSHPREVIEWOFSYSTEMS_GEN_ALL_CORE
CONSTITUTIONAL:  No weight loss, fever, chills, weakness or fatigue.  SKIN:  +erythema right Le  CARDIOVASCULAR:  No chest pain, chest pressure or chest discomfort. No palpitations or edema.  RESPIRATORY:  No shortness of breath, cough or sputum.  GASTROINTESTINAL:  No anorexia, nausea, vomiting or diarrhea. No abdominal pain or blood.  GENITOURINARY:  Denies hematuria, dysuria.   NEUROLOGICAL: No HA, no numbness or tingling in extremities  HEMATOLOGIC:  No anemia  LYMPHATICS:  No enlarged nodes. No history of splenectomy.  PSYCHIATRIC:  No history of depression or anxiety.  ALLERGIES:  No history of asthma, hives, eczema or rhinitis. 8 CONSTITUTIONAL:  No weight loss, fever, chills, weakness or fatigue.  SKIN:  +erythema right LE  CARDIOVASCULAR:  No chest pain, chest pressure or chest discomfort. No palpitations or edema.  RESPIRATORY:  No shortness of breath, cough or sputum.  GASTROINTESTINAL:  No anorexia, nausea, vomiting or diarrhea. No abdominal pain or blood.  GENITOURINARY:  Denies hematuria, dysuria.   NEUROLOGICAL: No HA, no numbness or tingling in extremities  HEMATOLOGIC:  No anemia  LYMPHATICS:  No enlarged nodes. No history of splenectomy.  PSYCHIATRIC:  No history of depression or anxiety.  ALLERGIES:  No history of asthma, hives, eczema or rhinitis.

## 2020-06-24 NOTE — ED CDU PROVIDER DISPOSITION NOTE - ATTENDING CONTRIBUTION TO CARE
lupis: swelling to infected site increasing. pt failing clindamycin alone. WIll broaden coverage and admit to hospital. Detail Level: Zone Detail Level: Detailed lupis: swelling to infected site increasing. pt failing clindamycin alone. WIll broaden coverage and admit to hospital..

## 2024-03-13 NOTE — ED ADULT NURSE NOTE - NS ED NURSE RECORD ANOTHER VITAL SIGN
free to contact us for any clarifications as needed.    [facility]  [facility], [address],  [place], [place]. [phone]     Yes, record another set of vital signs

## 2024-08-31 NOTE — DISCHARGE NOTE ADULT - NS AS DC FOLLOWUP STROKE INST
Keflex , Cellulitis
Ears: no ear pain and no hearing problems. Nose: no nasal congestion and no nasal drainage. Mouth/Throat: no dysphagia, no hoarseness and no throat pain. Neck: no lumps, no pain, no stiffness and no swollen glands.
